# Patient Record
Sex: MALE | Race: WHITE | NOT HISPANIC OR LATINO | Employment: FULL TIME | URBAN - METROPOLITAN AREA
[De-identification: names, ages, dates, MRNs, and addresses within clinical notes are randomized per-mention and may not be internally consistent; named-entity substitution may affect disease eponyms.]

---

## 2018-09-12 ENCOUNTER — TELEPHONE (OUTPATIENT)
Dept: GASTROENTEROLOGY | Facility: CLINIC | Age: 48
End: 2018-09-12

## 2018-09-12 NOTE — TELEPHONE ENCOUNTER
Spoke with pt wife. I explained the process of an appointment with . Pt wife will work on getting referral and records faxed over.

## 2018-09-12 NOTE — TELEPHONE ENCOUNTER
----- Message from Selene Garcia sent at 9/12/2018  9:33 AM CDT -----  Contact: Pt wife Matilda Grey is requesting to schedule a NP visit for second opinion condition is Gastroparesis    Matilda is requesting a callback at [phone] says Dr Posada was highly recommended and would prefer to schedule with her     Thanks

## 2018-10-03 ENCOUNTER — TELEPHONE (OUTPATIENT)
Dept: GASTROENTEROLOGY | Facility: CLINIC | Age: 48
End: 2018-10-03

## 2018-10-03 NOTE — TELEPHONE ENCOUNTER
----- Message from Tracee Gann sent at 10/1/2018  8:49 AM CDT -----  Contact: wife - wes de la cruz  - [phone]  lammi - gastroparesis - is asking for appt - please call wife - wes de la cruz  - [phone]

## 2018-10-16 ENCOUNTER — OFFICE VISIT (OUTPATIENT)
Dept: GASTROENTEROLOGY | Facility: CLINIC | Age: 48
End: 2018-10-16
Payer: COMMERCIAL

## 2018-10-16 ENCOUNTER — TELEPHONE (OUTPATIENT)
Dept: ENDOSCOPY | Facility: HOSPITAL | Age: 48
End: 2018-10-16

## 2018-10-16 ENCOUNTER — TELEPHONE (OUTPATIENT)
Dept: GASTROENTEROLOGY | Facility: CLINIC | Age: 48
End: 2018-10-16

## 2018-10-16 ENCOUNTER — LAB VISIT (OUTPATIENT)
Dept: LAB | Facility: HOSPITAL | Age: 48
End: 2018-10-16
Payer: COMMERCIAL

## 2018-10-16 VITALS
DIASTOLIC BLOOD PRESSURE: 80 MMHG | WEIGHT: 248.69 LBS | SYSTOLIC BLOOD PRESSURE: 111 MMHG | HEIGHT: 67 IN | HEART RATE: 87 BPM | BODY MASS INDEX: 39.03 KG/M2

## 2018-10-16 DIAGNOSIS — K21.9 GASTROESOPHAGEAL REFLUX DISEASE WITHOUT ESOPHAGITIS: Primary | ICD-10-CM

## 2018-10-16 DIAGNOSIS — K21.9 GASTROESOPHAGEAL REFLUX DISEASE WITHOUT ESOPHAGITIS: ICD-10-CM

## 2018-10-16 DIAGNOSIS — R10.12 ABDOMINAL PAIN, BILATERAL UPPER QUADRANT: ICD-10-CM

## 2018-10-16 DIAGNOSIS — K76.0 FATTY LIVER: ICD-10-CM

## 2018-10-16 DIAGNOSIS — R10.11 ABDOMINAL PAIN, BILATERAL UPPER QUADRANT: ICD-10-CM

## 2018-10-16 DIAGNOSIS — R14.0 BLOATING: ICD-10-CM

## 2018-10-16 DIAGNOSIS — R11.2 NAUSEA AND VOMITING, INTRACTABILITY OF VOMITING NOT SPECIFIED, UNSPECIFIED VOMITING TYPE: ICD-10-CM

## 2018-10-16 DIAGNOSIS — R63.4 WEIGHT LOSS: ICD-10-CM

## 2018-10-16 DIAGNOSIS — R68.81 EARLY SATIETY: ICD-10-CM

## 2018-10-16 DIAGNOSIS — K92.1 BLACK STOOL: ICD-10-CM

## 2018-10-16 DIAGNOSIS — Z12.11 SCREEN FOR COLON CANCER: Primary | ICD-10-CM

## 2018-10-16 LAB
ALBUMIN SERPL BCP-MCNC: 4.1 G/DL
ALP SERPL-CCNC: 55 U/L
ALT SERPL W/O P-5'-P-CCNC: 41 U/L
ANION GAP SERPL CALC-SCNC: 7 MMOL/L
AST SERPL-CCNC: 24 U/L
BASOPHILS # BLD AUTO: 0.04 K/UL
BASOPHILS NFR BLD: 0.3 %
BILIRUB SERPL-MCNC: 1.1 MG/DL
BUN SERPL-MCNC: 7 MG/DL
CALCIUM SERPL-MCNC: 10 MG/DL
CHLORIDE SERPL-SCNC: 106 MMOL/L
CO2 SERPL-SCNC: 25 MMOL/L
CREAT SERPL-MCNC: 0.8 MG/DL
DIFFERENTIAL METHOD: ABNORMAL
EOSINOPHIL # BLD AUTO: 0.2 K/UL
EOSINOPHIL NFR BLD: 1.8 %
ERYTHROCYTE [DISTWIDTH] IN BLOOD BY AUTOMATED COUNT: 17.1 %
EST. GFR  (AFRICAN AMERICAN): >60 ML/MIN/1.73 M^2
EST. GFR  (NON AFRICAN AMERICAN): >60 ML/MIN/1.73 M^2
FERRITIN SERPL-MCNC: 106 NG/ML
GLUCOSE SERPL-MCNC: 126 MG/DL
HBV CORE AB SERPL QL IA: NEGATIVE
HBV SURFACE AG SERPL QL IA: NEGATIVE
HCT VFR BLD AUTO: 55.5 %
HCV AB SERPL QL IA: NEGATIVE
HEPATITIS A ANTIBODY, IGG: NEGATIVE
HGB BLD-MCNC: 18.4 G/DL
IGA SERPL-MCNC: 235 MG/DL
IMM GRANULOCYTES # BLD AUTO: 0.08 K/UL
IMM GRANULOCYTES NFR BLD AUTO: 0.6 %
IRON SERPL-MCNC: 65 UG/DL
LYMPHOCYTES # BLD AUTO: 3 K/UL
LYMPHOCYTES NFR BLD: 24.1 %
MCH RBC QN AUTO: 28.9 PG
MCHC RBC AUTO-ENTMCNC: 33.2 G/DL
MCV RBC AUTO: 87 FL
MONOCYTES # BLD AUTO: 0.7 K/UL
MONOCYTES NFR BLD: 5.3 %
NEUTROPHILS # BLD AUTO: 8.4 K/UL
NEUTROPHILS NFR BLD: 67.9 %
NRBC BLD-RTO: 0 /100 WBC
PLATELET # BLD AUTO: 273 K/UL
PMV BLD AUTO: 10.7 FL
POTASSIUM SERPL-SCNC: 4.1 MMOL/L
PROT SERPL-MCNC: 7.6 G/DL
RBC # BLD AUTO: 6.36 M/UL
SATURATED IRON: 19 %
SODIUM SERPL-SCNC: 138 MMOL/L
TOTAL IRON BINDING CAPACITY: 345 UG/DL
TRANSFERRIN SERPL-MCNC: 233 MG/DL
TSH SERPL DL<=0.005 MIU/L-ACNC: 2.26 UIU/ML
WBC # BLD AUTO: 12.37 K/UL

## 2018-10-16 PROCEDURE — 99999 PR PBB SHADOW E&M-EST. PATIENT-LVL IV: CPT | Mod: PBBFAC,,, | Performed by: NURSE PRACTITIONER

## 2018-10-16 PROCEDURE — 86706 HEP B SURFACE ANTIBODY: CPT

## 2018-10-16 PROCEDURE — 87340 HEPATITIS B SURFACE AG IA: CPT

## 2018-10-16 PROCEDURE — 82728 ASSAY OF FERRITIN: CPT

## 2018-10-16 PROCEDURE — 80053 COMPREHEN METABOLIC PANEL: CPT

## 2018-10-16 PROCEDURE — 86803 HEPATITIS C AB TEST: CPT

## 2018-10-16 PROCEDURE — 83540 ASSAY OF IRON: CPT

## 2018-10-16 PROCEDURE — 86790 VIRUS ANTIBODY NOS: CPT

## 2018-10-16 PROCEDURE — 85025 COMPLETE CBC W/AUTO DIFF WBC: CPT

## 2018-10-16 PROCEDURE — 84443 ASSAY THYROID STIM HORMONE: CPT

## 2018-10-16 PROCEDURE — 82784 ASSAY IGA/IGD/IGG/IGM EACH: CPT

## 2018-10-16 PROCEDURE — 86704 HEP B CORE ANTIBODY TOTAL: CPT

## 2018-10-16 PROCEDURE — 99245 OFF/OP CONSLTJ NEW/EST HI 55: CPT | Mod: S$GLB,,, | Performed by: NURSE PRACTITIONER

## 2018-10-16 PROCEDURE — 83516 IMMUNOASSAY NONANTIBODY: CPT

## 2018-10-16 RX ORDER — ONDANSETRON 4 MG/1
TABLET, ORALLY DISINTEGRATING ORAL
COMMUNITY
End: 2018-10-16

## 2018-10-16 RX ORDER — PROMETHAZINE HYDROCHLORIDE 25 MG/1
TABLET ORAL
COMMUNITY
End: 2018-11-20

## 2018-10-16 RX ORDER — TESTOSTERONE CYPIONATE 1000 MG/10ML
200 INJECTION, SOLUTION INTRAMUSCULAR
COMMUNITY

## 2018-10-16 RX ORDER — PROCHLORPERAZINE MALEATE 10 MG
10 TABLET ORAL 3 TIMES DAILY PRN
Qty: 90 TABLET | Refills: 6 | Status: SHIPPED | OUTPATIENT
Start: 2018-10-16 | End: 2018-11-15

## 2018-10-16 RX ORDER — POLYETHYLENE GLYCOL 3350, SODIUM SULFATE ANHYDROUS, SODIUM BICARBONATE, SODIUM CHLORIDE, POTASSIUM CHLORIDE 236; 22.74; 6.74; 5.86; 2.97 G/4L; G/4L; G/4L; G/4L; G/4L
4 POWDER, FOR SOLUTION ORAL ONCE
Qty: 4000 ML | Refills: 0 | Status: SHIPPED | OUTPATIENT
Start: 2018-10-16 | End: 2018-10-16

## 2018-10-16 RX ORDER — LOSARTAN POTASSIUM 100 MG/1
TABLET ORAL
COMMUNITY

## 2018-10-16 RX ORDER — IBUPROFEN 100 MG/5ML
SUSPENSION, ORAL (FINAL DOSE FORM) ORAL
COMMUNITY
End: 2018-12-17

## 2018-10-16 RX ORDER — ERGOCALCIFEROL 1.25 MG/1
CAPSULE ORAL
COMMUNITY

## 2018-10-16 RX ORDER — PANTOPRAZOLE SODIUM 40 MG/1
TABLET, DELAYED RELEASE ORAL
COMMUNITY
End: 2018-12-10

## 2018-10-16 NOTE — TELEPHONE ENCOUNTER
Patient is scheduled for his EGD/Colonoscopy on November 19th at 1:00pm.    Patient can now be scheduled for his GES. Patient wants the next day Nov 20th please.    See note below:    Aysha Smith NP          10/16/18 12:34 PM   Note         GI procedures:  Day 1: EGD/colon. 5 days full liquids  Day 2: GES

## 2018-10-16 NOTE — PROGRESS NOTES
[facility] Consultation Note    Reason for Consult:    Chief Complaint   Patient presents with    Abdominal Pain     central , but after eating UQ    Heartburn    Nausea    Emesis    Gas    Bloated    Weight Loss         PCP:   Jamia Mckeon   [address] [facility] / [place] [address]    Referring MD:  Hector Porter Md  [address] [place] [address]     Current GI: /Janna Rodriguez NP      HPI:  Isaak Webb is a 48 y.o. male with a PMH of DM 2, HTN, s/p russell  referred to motility clinic for second opinion regarding the following problems:    GERD.  Patient reports bothersome heartburn  Retrosternal pyrosis:yes  Regurgitation:sometimes  Belching:yes  Onset: several years  Frequency: couple days weekly  Improve with:  Some improvement with pantoprazole 40 mg once daily. Minimal improvement with nexium and prilosec. Cannot recall if dexilant helped. Has not tied prevacid or aciphex  Upright symptoms:no   Nocturnal symptoms:  yes  Hoarseness:no  Cough:no  Throat clearing:yes  Food Triggers:none  Caffeine intake:6-8 coke zero/day  Was sleeping with head of the bed elevated.   Avoids eating prior to bedtime.      EOE. Diagnosed 10 years ago. H/o swallowed fluticasone several years ago. Now on pantoprazole daily.     H/o dysphagia. None since starting pantoprazole 40 mg daily x 4-6 months.    Nausea.    Frequency:daily  Onset: summer 2017    Early satiety.  Frequency:daily  Onset:summer 2017    Vomiting  Frequency:couple days monthly  Onset:summer 2017  Timing of vomiting:  Within 30 min of eating:no  Within 3 hours after eating: yes       Cyclical episodes of n/v with symptom free intervals between episodes:yes. Would go couple months b/t episodes of severe n/v for few days at s/s onset. More constant nausea recently.   Prodrome:belching  History of migraines:no PH, mother with migraines  Marijuana use:no  Unusual bathing behaviors:no    Improves with:  No   improvement with zofran 4 mg q 4-6 hrs   Some improvement with phenergan 25 mg suppositories PRN, but sedates him  Has not tried compazine, reglan, domperidone, scopolamine patch      Gastroparesis diagnosed: June 2018 based on s/s    Etiology:    Dm-yes   Idopathic  Postsurgical-no  Parkinsonism-no  Amyloidosis-no  Paraneoplastic disease-no  Scleroderma-no  Mesenteric ischemia-no    History of:  Prior gastric or bariatric surgery: no  Diabetes mellitus: no  Thyroid dysfunction: no  Neurological disease: no   Autoimmune disorders: no    Number of GP related hospitalization in the past year: none  Number of GP related ED visit in the past year: none    Interventions: (pyloroplasty, botox, gastric stimulator, gastroparesis diet): on GP diet. Soups, soft foods, smoothies TID, 2 sm snacks daily. No dietitian.      Curenlty on following medications that may affect gastric emptying:   Narcotics (morphine, oxycodone, tapentadol, less with tramadol):no  Anticholinergics: no  Cyclosporine:no  Diabetic medications (GLP-1 analogs, Exenatide, Lixisenatide, Livaglutide, Albiglutide, DulaglutatidePramlintide - SymlinPen (injection):none    DM 2. Onset: couple years ago. Has not been checking BS. HbA1c 6.5 last month. Taking glyxambi 25-5 mg daily x 1 year. Per pcp. H/o metformin, injections in the past.    Abdominal pain. Reports abdominal pain  Character:tender epigastric, postprandial cramping  Location and frequency:epigastric constant, postprandial across upper abd  Duration:cramping lasts for 1-2 hours  Onset:summer 2017  Worse with:meals  Improves with:lying down  Associated with Bm: no  Nocturnal pain: yes  Has not tried IBgard, Bentyl, Levsin, Levbid.  Antidepressants:no  Using narcotic pain medication: no   NSAIDs:ibuprofen couple days weekly for HAs    Gas and bloating.   Bloating: yes  Excessive gas: yes  Abdominal distension:no   Symptoms get worse after meals:yes  Symptoms get worse as the day progresses:   yes  Consumes lactose:yes  Consumes artificial sugars:yes    Black stools. Once week. Onset: few months ago. No pepto, charco caps, iron.    Weight loss. Lost 30 lbs since 7/2018. Has lost 5 lbs in the last few weeks.     Fatty liver. Hepatomegaly on abdominal us.       Denies dysphagia,  diarrhea, constipation, BRBPR, anxiety/depression, insomnia.       Total visit time was 90 minutes, more than 50% of which was spent in face-to-face counseling with patient regarding symptoms, diagnostic results, prognosis, risks and benefits of treatment options, instructions for management, importance of compliance with chosen treatment options, risk factor reduction, stress reduction, coping strategies.      Previous Studies:   EGD 7/2/18: nl esophagus (reflux esophagitis). Stomach nl (chr gastritis). Nl duodenum (mild peptic changes).  Ct abd/pelvis 4/4/18: nl s/p russell  Abd us 3/22/18: limited by body habitus. s/p russell. Hepatomegaly w/ fatty liver  EGD 5/31/11: mid esophageal rings (inflammation w/ numerous eosinophils c/w EOE similar to 2007). Nl stomach. (mild chr gastritis) nl duodenum.     Labs:   5/11/2018: cmp nl, wbc high 10.6, RDW high 16.9    Relevant surgeries  Russell (20 yrs ago)    ROS:  ROS   Constitutional: No fevers, no chills, + night sweats, + weight loss  ENT: No congestion, no rhinorrhea, no chronic sinus problems  CV: No chest pain, no palpitations  Pulm: No cough, no shortness of breath  Ophtho: No blurry vision, no eye redness  GI: see HPI  Derm: No rash  Heme: No lymphadenopathy, no bruising  MSK: No joint pain, no joint swelling, no Raynauds  : No dysuria, no frequent urination, no blood in urine  Endo: No hot or cold intolerance  Neuro: No dizziness, no syncope, no seizure  Psych: No anxiety, no depression        Medical History:   Past Medical History:   Diagnosis Date    Cholelithiasis     GERD (gastroesophageal reflux disease)         Surgical History:   Past Surgical History:   Procedure  Laterality Date    CHOLECYSTECTOMY      esophagus dilation       UPPER GASTROINTESTINAL ENDOSCOPY          Family History:   Family History   Problem Relation Age of Onset    Melanoma Father     Skin cancer Maternal Uncle     Celiac disease Neg Hx     Cirrhosis Neg Hx     Colon cancer Neg Hx     Colon polyps Neg Hx     Crohn's disease Neg Hx     Cystic fibrosis Neg Hx     Esophageal cancer Neg Hx     Hemochromatosis Neg Hx     Irritable bowel syndrome Neg Hx     Inflammatory bowel disease Neg Hx     Liver cancer Neg Hx     Liver disease Neg Hx     Rectal cancer Neg Hx     Stomach cancer Neg Hx     Ulcerative colitis Neg Hx     Jerry's disease Neg Hx     Lymphoma Neg Hx     Tuberculosis Neg Hx     Scleroderma Neg Hx     Rheum arthritis Neg Hx     Multiple sclerosis Neg Hx     Lupus Neg Hx     Psoriasis Neg Hx         Social History:   Social History     Socioeconomic History    Marital status:      Spouse name: None    Number of children: None    Years of education: None    Highest education level: None   Social Needs    Financial resource strain: None    Food insecurity - worry: None    Food insecurity - inability: None    Transportation needs - medical: None    Transportation needs - non-medical: None   Occupational History    None   Tobacco Use    Smoking status: Never Smoker    Smokeless tobacco: Never Used   Substance and Sexual Activity    Alcohol use: No     Frequency: Never    Drug use: No    Sexual activity: None   Other Topics Concern    None   Social History Narrative    None        Review of patient's allergies indicates:  No Known Allergies    Current Outpatient Medications   Medication Sig Dispense Refill    ascorbic acid, vitamin C, (VITAMIN C) 1000 MG tablet Vitamin C      cetirizine (ZYRTEC) 10 mg Cap Zyrtec 10 mg capsule   Take by oral route.      empagliflozin-linagliptin (GLYXAMBI) 25-5 mg Tab Glyxambi 25 mg-5 mg tablet   Take 1 tablet every  "day by oral route.      ergocalciferol (ERGOCALCIFEROL) 50,000 unit Cap Vitamin D2 50,000 unit capsule   Take 1 capsule every week by oral route.      flu vaccine ts 2016-17,4yr up, (FLUVIRIN [id number]) 45 mcg (15 mcg x 3)/0.5 mL Susp Fluvirin 3301-7205 45 mcg (15 mcg x 3)/0.5 mL intramuscular suspension   ADM 0.5ML IM UTD      FLUZONE QUAD [id number], PF, 60 mcg (15 mcg x 4)/0.5 mL Syrg ADM 0.5ML IM UTD  0    losartan (COZAAR) 100 MG tablet losartan 100 mg tablet   Take 1 tablet every day by oral route.      multivit with minerals/lutein (MULTIVITAMIN 50 PLUS ORAL) multivitamin      omega 3-dha-epa-fish oil (FISH OIL) 100-160-1,000 mg Cap Take 1,000 mg by mouth once daily.      pantoprazole (PROTONIX) 40 MG tablet pantoprazole 40 mg tablet,delayed release   Take 1 tablet every day by oral route.      promethazine (PHENERGAN) 25 MG tablet promethazine 25 mg tablet   Take 1 tablet every 6 hours by oral route.      testosterone cypionate (DEPOTESTOTERONE CYPIONATE) 100 mg/mL injection Inject 200 mg/mL as directed.      polyethylene glycol (GOLYTELY,NULYTELY) 236-22.74-6.74 -5.86 gram suspension Take 4,000 mLs (4 L total) by mouth once. for 1 dose 4000 mL 0    prochlorperazine (COMPAZINE) 10 MG tablet Take 1 tablet (10 mg total) by mouth 3 (three) times daily as needed. 90 tablet 6     No current facility-administered medications for this visit.         Objective Findings:  Vital Signs:  /80   Pulse 87   Ht 5' 7" (1.702 m)   Wt 112.8 kg (248 lb 10.9 oz)   BMI 38.95 kg/m²   Body mass index is 38.95 kg/m².    Physical Exam:  General appearance: alert, cooperative, no distress  HENT: Normocephalic, atraumatic, neck symmetrical, no nasal discharge  Eyes: conjunctivae/corneas clear, PERRL, EOM's intact  Lungs: clear to auscultation bilaterally, no dullness to percussion bilaterally  Heart: regular rate and rhythm without rub; no displacement of the PMI  Abdomen: soft, non-tender; bowel sounds " normoactive; no organomegaly  Extremities: extremities symmetric; no clubbing, cyanosis, or edema  Integument: Skin color, texture, turgor normal; no rashes; hair distrubution normal  Neurologic: Alert and oriented X 3, normal strength, normal coordination and gait  Psychiatric: no pressured speech; normal affect; no evidence of impaired cognition    Labs:  Lab Results   Component Value Date    WBC 12.37 10/16/2018    HGB 18.4 (H) 10/16/2018    HCT 55.5 (H) 10/16/2018    MCV 87 10/16/2018     10/16/2018     No results found for: FERRITIN  No results found for: NA, K, CL, CO2, GLU, BUN, CREATININE, CALCIUM, PROT, ALBUMIN, BILITOT, ALKPHOS, AST, ALT  No results found for: TSH  No results found for: SEDRATE  No results found for: CRP  No results found for: LABA1C, HGBA1C        Assessment and Plan:  Isaak Webb is a 48 y.o. male with a PMH of DM 2, HTN, s/p russell  referred to motility clinic for second opinion regarding the following problems:    GERD.      Minimal improvement with nexium and prilosec.   Cannot recall if dexilant helped.  Some improvement with pantoprazole 40 mg once daily.  - Has not tied prevacid or aciphex    Eosinophilic esopahgitis. Diagnosed 10 years ago. Has taken swallowed fluticasone several years ago. Now on pantoprazole daily.     History of dysphagia. None since starting pantoprazole 40 mg daily x 4-6 months.    Nausea and early satiety daily. Vomiting few times per month. Symptoms appeared to have been cyclical at symptom onset, but have been more constant since. Was told he has gastroparesis in 6/2018. Never had gastric emptying study.   No  improvement with zofran 4 mg q 4-6 hrs   Some improvement with phenergan 25 mg suppositories PRN, but causes sedation  -Start compazine 10 mg Q8 hrs PRN  -EGD w/ e/g/d biopsies  -Gastric emptying study   -Check labs  -Discussed pathophysiology, prognosis, workup and treatment of gastroparesis, including surgical options.  Provided  handout.   -Discussed gastroparesis diet, provided handout.    -referral to GI dietitian.  -Has not tried reglan, domperidone, scopolamine patch      DM 2. X couple of years. HbA1c 6.5 last month. Has taken metformin and injections in the past.  Taking glyxambi 25-5 mg daily x 1 year. Per pcp.     Abdominal pain. Nocturnal pain.  On Ibuprofen couple days weekly   -EGD  -Labs  -GES  -Start FDgard PRN  -Has not tried IBgard, Bentyl, Levsin, Levbid.    Gas and bloating.   -Eliminate artificial sugars and lactose    Black stools. Once week. Onset: few months ago. No pepto, charco caps, iron.  -EGD  -Labs    Weight loss. Lost 30 lbs since 7/2018. Has lost 5 lbs in the last few weeks.   -EGD/colon    Fatty liver. Hepatomegaly on abdominal us.   -Check labs      Follow-up in about 2 months (around 12/16/2018) for Motility w/ Thierry,NP.    1. Gastroesophageal reflux disease without esophagitis    2. Abdominal pain, bilateral upper quadrant    3. Bloating    4. Nausea and vomiting, intractability of vomiting not specified, unspecified vomiting type    5. Black stool    6. Weight loss    7. Fatty liver    8. Early satiety          Order summary:  Orders Placed This Encounter    NM Gastric Emptying    CBC auto differential    Comprehensive metabolic panel    TSH    TISSUE TRANSGLUTAMINASE (TTG), IGA    IgA    Iron and TIBC    Ferritin    Hepatitis B Surface Antibody, Qual/Quant    Hepatitis B surface antigen    Hepatitis B core antibody, total    Hepatitis C antibody    Hepatitis A antibody, IgG    prochlorperazine (COMPAZINE) 10 MG tablet    Case request GI: EGD (ESOPHAGOGASTRODUODENOSCOPY), COLONOSCOPY         Thank you so much for allowing me to participate in the care of Isaak Webb          Aysha Smith, APRN, FNP-C    I have seen and examined the patient with the resident.  I agree with his note, assessment and plan.      Idalia Posada MD

## 2018-10-16 NOTE — LETTER
October 19, 2018        Hector Porter MD  [address] [place] [id number]             [facility]  [address]  [place] [address]  Phone: [phone]  Fax: [phone]   Patient: Isaak Webb   MR Number: [id number]   YOB: 1970   Date of Visit: 10/16/2018       Dear Dr. Porter:    Thank you for referring Isaak Webb to me for evaluation. Attached you will find relevant portions of my assessment and plan of care.    If you have questions, please do not hesitate to call me. I look forward to following Isaak Webb along with you.    Sincerely,                  CC  No Recipients    Enclosure

## 2018-10-16 NOTE — PATIENT INSTRUCTIONS
Eliminate all forms of dairy/lactose (milk, cheese, butter, creamers, ice cream etc) and artificial sweeteners (splenda, equal, stevia, truvia, crystal lite, diet sodas, sugar free candy/gum etc) from your diet for 14 days to see if gas and bloating improve.    Try over the counter FDgard as needed for abdominal pain.

## 2018-10-18 LAB
HEP. B SURF AB, QUAL: NEGATIVE
HEP. B SURF AB, QUANT.: <3 MIU/ML
TTG IGA SER IA-ACNC: 5 UNITS

## 2018-11-01 ENCOUNTER — TELEPHONE (OUTPATIENT)
Dept: GASTROENTEROLOGY | Facility: CLINIC | Age: 48
End: 2018-11-01

## 2018-11-01 DIAGNOSIS — K76.0 FATTY LIVER: Primary | ICD-10-CM

## 2018-11-01 DIAGNOSIS — R16.0 HEPATOMEGALY: ICD-10-CM

## 2018-11-01 DIAGNOSIS — R17 SERUM TOTAL BILIRUBIN ELEVATED: ICD-10-CM

## 2018-11-05 ENCOUNTER — TELEPHONE (OUTPATIENT)
Dept: GASTROENTEROLOGY | Facility: CLINIC | Age: 48
End: 2018-11-05

## 2018-11-19 ENCOUNTER — HOSPITAL ENCOUNTER (OUTPATIENT)
Facility: HOSPITAL | Age: 48
Discharge: HOME | End: 2018-11-19
Attending: INTERNAL MEDICINE | Admitting: INTERNAL MEDICINE
Payer: COMMERCIAL

## 2018-11-19 ENCOUNTER — ANESTHESIA EVENT (OUTPATIENT)
Dept: ENDOSCOPY | Facility: HOSPITAL | Age: 48
End: 2018-11-19
Payer: COMMERCIAL

## 2018-11-19 ENCOUNTER — ANESTHESIA (OUTPATIENT)
Dept: ENDOSCOPY | Facility: HOSPITAL | Age: 48
End: 2018-11-19
Payer: COMMERCIAL

## 2018-11-19 VITALS
OXYGEN SATURATION: 97 % | HEIGHT: 67 IN | BODY MASS INDEX: 38.45 KG/M2 | DIASTOLIC BLOOD PRESSURE: 78 MMHG | SYSTOLIC BLOOD PRESSURE: 122 MMHG | RESPIRATION RATE: 16 BRPM | HEART RATE: 93 BPM | TEMPERATURE: 98 F | WEIGHT: 245 LBS

## 2018-11-19 DIAGNOSIS — R63.4 WEIGHT LOSS: Primary | ICD-10-CM

## 2018-11-19 LAB
GLUCOSE SERPL-MCNC: 95 MG/DL (ref 70–110)
POCT GLUCOSE: 95 MG/DL (ref 70–110)

## 2018-11-19 PROCEDURE — 88342 IMHCHEM/IMCYTCHM 1ST ANTB: CPT | Mod: 26,,, | Performed by: PATHOLOGY

## 2018-11-19 PROCEDURE — 45385 COLONOSCOPY W/LESION REMOVAL: CPT | Performed by: INTERNAL MEDICINE

## 2018-11-19 PROCEDURE — 25000003 PHARM REV CODE 250: Performed by: NURSE ANESTHETIST, CERTIFIED REGISTERED

## 2018-11-19 PROCEDURE — E9220 PRA ENDO ANESTHESIA: HCPCS | Mod: ,,, | Performed by: NURSE ANESTHETIST, CERTIFIED REGISTERED

## 2018-11-19 PROCEDURE — 25000003 PHARM REV CODE 250: Performed by: INTERNAL MEDICINE

## 2018-11-19 PROCEDURE — 45381 COLONOSCOPY SUBMUCOUS NJX: CPT | Mod: 51,,, | Performed by: INTERNAL MEDICINE

## 2018-11-19 PROCEDURE — 37000008 HC ANESTHESIA 1ST 15 MINUTES: Performed by: INTERNAL MEDICINE

## 2018-11-19 PROCEDURE — 27201012 HC FORCEPS, HOT/COLD, DISP: Performed by: INTERNAL MEDICINE

## 2018-11-19 PROCEDURE — 37000009 HC ANESTHESIA EA ADD 15 MINS: Performed by: INTERNAL MEDICINE

## 2018-11-19 PROCEDURE — 45380 COLONOSCOPY AND BIOPSY: CPT | Performed by: INTERNAL MEDICINE

## 2018-11-19 PROCEDURE — 43239 EGD BIOPSY SINGLE/MULTIPLE: CPT | Performed by: INTERNAL MEDICINE

## 2018-11-19 PROCEDURE — 88305 TISSUE EXAM BY PATHOLOGIST: CPT | Mod: 26,,, | Performed by: PATHOLOGY

## 2018-11-19 PROCEDURE — 45380 COLONOSCOPY AND BIOPSY: CPT | Mod: 59,,, | Performed by: INTERNAL MEDICINE

## 2018-11-19 PROCEDURE — 43239 EGD BIOPSY SINGLE/MULTIPLE: CPT | Mod: 51,,, | Performed by: INTERNAL MEDICINE

## 2018-11-19 PROCEDURE — 88342 IMHCHEM/IMCYTCHM 1ST ANTB: CPT | Performed by: PATHOLOGY

## 2018-11-19 PROCEDURE — 63600175 PHARM REV CODE 636 W HCPCS: Performed by: NURSE ANESTHETIST, CERTIFIED REGISTERED

## 2018-11-19 PROCEDURE — 27201089 HC SNARE, DISP (ANY): Performed by: INTERNAL MEDICINE

## 2018-11-19 PROCEDURE — 45381 COLONOSCOPY SUBMUCOUS NJX: CPT | Performed by: INTERNAL MEDICINE

## 2018-11-19 PROCEDURE — 82962 GLUCOSE BLOOD TEST: CPT | Performed by: INTERNAL MEDICINE

## 2018-11-19 PROCEDURE — 45385 COLONOSCOPY W/LESION REMOVAL: CPT | Mod: ,,, | Performed by: INTERNAL MEDICINE

## 2018-11-19 PROCEDURE — 88305 TISSUE EXAM BY PATHOLOGIST: CPT | Performed by: PATHOLOGY

## 2018-11-19 RX ORDER — SODIUM CHLORIDE 9 MG/ML
INJECTION, SOLUTION INTRAVENOUS CONTINUOUS PRN
Status: DISCONTINUED | OUTPATIENT
Start: 2018-11-19 | End: 2018-11-19

## 2018-11-19 RX ORDER — PROPOFOL 10 MG/ML
VIAL (ML) INTRAVENOUS CONTINUOUS PRN
Status: DISCONTINUED | OUTPATIENT
Start: 2018-11-19 | End: 2018-11-19

## 2018-11-19 RX ORDER — LIDOCAINE HCL/PF 100 MG/5ML
SYRINGE (ML) INTRAVENOUS
Status: DISCONTINUED | OUTPATIENT
Start: 2018-11-19 | End: 2018-11-19

## 2018-11-19 RX ORDER — ETOMIDATE 2 MG/ML
INJECTION INTRAVENOUS
Status: DISCONTINUED | OUTPATIENT
Start: 2018-11-19 | End: 2018-11-19

## 2018-11-19 RX ORDER — PROPOFOL 10 MG/ML
VIAL (ML) INTRAVENOUS
Status: DISCONTINUED | OUTPATIENT
Start: 2018-11-19 | End: 2018-11-19

## 2018-11-19 RX ORDER — SODIUM CHLORIDE 9 MG/ML
INJECTION, SOLUTION INTRAVENOUS CONTINUOUS
Status: DISCONTINUED | OUTPATIENT
Start: 2018-11-19 | End: 2018-11-19 | Stop reason: HOSPADM

## 2018-11-19 RX ORDER — SODIUM CHLORIDE 0.9 % (FLUSH) 0.9 %
3 SYRINGE (ML) INJECTION
Status: DISCONTINUED | OUTPATIENT
Start: 2018-11-19 | End: 2018-11-19 | Stop reason: HOSPADM

## 2018-11-19 RX ORDER — GLYCOPYRROLATE 0.2 MG/ML
INJECTION INTRAMUSCULAR; INTRAVENOUS
Status: DISCONTINUED | OUTPATIENT
Start: 2018-11-19 | End: 2018-11-19

## 2018-11-19 RX ADMIN — SODIUM CHLORIDE: 0.9 INJECTION, SOLUTION INTRAVENOUS at 12:11

## 2018-11-19 RX ADMIN — PROPOFOL 20 MG: 10 INJECTION, EMULSION INTRAVENOUS at 01:11

## 2018-11-19 RX ADMIN — GLYCOPYRROLATE 0.2 MG: 0.2 INJECTION, SOLUTION INTRAMUSCULAR; INTRAVENOUS at 01:11

## 2018-11-19 RX ADMIN — ETOMIDATE 4 MG: 2 INJECTION, SOLUTION INTRAVENOUS at 02:11

## 2018-11-19 RX ADMIN — SODIUM CHLORIDE: 9 INJECTION, SOLUTION INTRAVENOUS at 01:11

## 2018-11-19 RX ADMIN — PROPOFOL 30 MG: 10 INJECTION, EMULSION INTRAVENOUS at 01:11

## 2018-11-19 RX ADMIN — LIDOCAINE HYDROCHLORIDE 100 MG: 20 INJECTION, SOLUTION INTRAVENOUS at 01:11

## 2018-11-19 RX ADMIN — PROPOFOL 150 MCG/KG/MIN: 10 INJECTION, EMULSION INTRAVENOUS at 01:11

## 2018-11-19 RX ADMIN — PROPOFOL 40 MG: 10 INJECTION, EMULSION INTRAVENOUS at 01:11

## 2018-11-19 RX ADMIN — PROPOFOL 70 MG: 10 INJECTION, EMULSION INTRAVENOUS at 01:11

## 2018-11-19 NOTE — ANESTHESIA POSTPROCEDURE EVALUATION
"Anesthesia Post Evaluation    Patient: Isaak Webb    Procedure(s) Performed: Procedure(s) (LRB):  EGD (ESOPHAGOGASTRODUODENOSCOPY) (N/A)  COLONOSCOPY (N/A)    Final Anesthesia Type: general  Patient location during evaluation: GI PACU  Patient participation: Yes- Able to Participate  Level of consciousness: awake and alert and oriented  Post-procedure vital signs: reviewed and stable  Pain management: adequate  Airway patency: patent  PONV status at discharge: No PONV  Anesthetic complications: no      Cardiovascular status: blood pressure returned to baseline  Respiratory status: unassisted, spontaneous ventilation and room air  Hydration status: euvolemic  Follow-up not needed.        Visit Vitals  /69 (BP Location: Left arm, Patient Position: Lying)   Pulse 90   Temp 36.7 °C (98.1 °F)   Resp 20   Ht 5' 7" (1.702 m)   Wt 111.1 kg (245 lb)   SpO2 97%   BMI 38.37 kg/m²       Pain/Janusz Score: Pain Assessment Performed: Yes (11/19/2018  2:42 PM)  Presence of Pain: denies (11/19/2018  2:42 PM)  Janusz Score: 9 (11/19/2018  2:42 PM)        "

## 2018-11-19 NOTE — DISCHARGE INSTRUCTIONS

## 2018-11-19 NOTE — H&P
Short Stay Endoscopy History and Physical    PCP - Jamia Mckeon MD     Procedure - EGD/colon  ASA - per anesthesia  Mallampati - per anesthesia  History of Anesthesia problems - no  Family history Anesthesia problems -  no   Plan of anesthesia - General    HPI:  This is a 48 y.o. male here for evaluation of dysphagia, EoE, GERD, nausea, satiety, abd pain, melena, weight loss.      ROS:  Constitutional: No fevers, chills, No weight loss  CV: No chest pain  Pulm: No cough, No shortness of breath  Ophtho: No vision changes  GI: see HPI  Derm: No rash    Medical History:  has a past medical history of Cholelithiasis and GERD (gastroesophageal reflux disease).    Surgical History:  has a past surgical history that includes Cholecystectomy; Upper gastrointestinal endoscopy; and esophagus dilation .    Family History: family history includes Melanoma in his father; Skin cancer in his maternal uncle.. Otherwise no colon cancer, inflammatory bowel disease, or GI malignancies.    Social History:  reports that  has never smoked. he has never used smokeless tobacco. He reports that he does not drink alcohol or use drugs.    Review of patient's allergies indicates:  No Known Allergies    Medications:   Medications Prior to Admission   Medication Sig Dispense Refill Last Dose    ascorbic acid, vitamin C, (VITAMIN C) 1000 MG tablet Vitamin C   11/18/2018 at Unknown time    cetirizine (ZYRTEC) 10 mg Cap Zyrtec 10 mg capsule   Take by oral route.   11/18/2018 at Unknown time    empagliflozin-linagliptin (GLYXAMBI) 25-5 mg Tab Glyxambi 25 mg-5 mg tablet   Take 1 tablet every day by oral route.   11/18/2018 at Unknown time    ergocalciferol (ERGOCALCIFEROL) 50,000 unit Cap Vitamin D2 50,000 unit capsule   Take 1 capsule every week by oral route.   11/18/2018 at Unknown time    losartan (COZAAR) 100 MG tablet losartan 100 mg tablet   Take 1 tablet every day by oral route.   11/19/2018 at Unknown time    multivit with  minerals/lutein (MULTIVITAMIN 50 PLUS ORAL) multivitamin   11/18/2018 at Unknown time    omega 3-dha-epa-fish oil (FISH OIL) 100-160-1,000 mg Cap Take 1,000 mg by mouth once daily.   11/18/2018 at Unknown time    pantoprazole (PROTONIX) 40 MG tablet pantoprazole 40 mg tablet,delayed release   Take 1 tablet every day by oral route.   11/18/2018 at Unknown time    testosterone cypionate (DEPOTESTOTERONE CYPIONATE) 100 mg/mL injection Inject 200 mg/mL as directed.   Past Month at Unknown time    flu vaccine ts 2016-17,4yr up, (FLUVIRIN 2358-2829) 45 mcg (15 mcg x 3)/0.5 mL Susp Fluvirin 7759-5448 45 mcg (15 mcg x 3)/0.5 mL intramuscular suspension   ADM 0.5ML IM UTD   More than a month at Unknown time    FLUZONE QUAD [id number], PF, 60 mcg (15 mcg x 4)/0.5 mL Syrg ADM 0.5ML IM UTD  0 More than a month at Unknown time    promethazine (PHENERGAN) 25 MG tablet promethazine 25 mg tablet   Take 1 tablet every 6 hours by oral route.   More than a month at Unknown time       Physical Exam:    Vital Signs:   Vitals:    11/19/18 1305   BP: 115/66   Pulse: 83   Resp: 17   Temp: 98.8 °F (37.1 °C)       General Appearance: Well appearing in no acute distress  Eyes:    No scleral icterus  Lungs: CTA anteriorly  Heart:  Regular rate, S1, S2 normal, no murmurs heard.  Abdomen: Soft, non tender, non distended with normal bowel sounds. No hepatosplenomegaly, ascites, or mass.  Extremities: No edema  Skin: No rash    Labs:  Lab Results   Component Value Date    WBC 12.37 10/16/2018    HGB 18.4 (H) 10/16/2018    HCT 55.5 (H) 10/16/2018     10/16/2018    ALT 41 10/16/2018    AST 24 10/16/2018     10/16/2018    K 4.1 10/16/2018     10/16/2018    CREATININE 0.8 10/16/2018    BUN 7 10/16/2018    CO2 25 10/16/2018    TSH 2.256 10/16/2018       I have explained the risks and benefits of endoscopy procedures to the patient including but not limited to bleeding, perforation, infection, and death.      Idalia Posada,  MD

## 2018-11-19 NOTE — ANESTHESIA PREPROCEDURE EVALUATION
11/19/2018  Isaak Webb is a 48 y.o., male.  Past Medical History:   Diagnosis Date    Cholelithiasis     GERD (gastroesophageal reflux disease)      Past Surgical History:   Procedure Laterality Date    CHOLECYSTECTOMY      esophagus dilation       UPPER GASTROINTESTINAL ENDOSCOPY           Anesthesia Evaluation    I have reviewed the Patient Summary Reports.    I have reviewed the Nursing Notes.   I have reviewed the Medications.     Review of Systems  Anesthesia Hx:  No problems with previous Anesthesia  Neg history of prior surgery. Denies Family Hx of Anesthesia complications.   Denies Personal Hx of Anesthesia complications.   Hematology/Oncology:  Hematology Normal   Oncology Normal     EENT/Dental:EENT/Dental Normal   Cardiovascular:   Hypertension    Pulmonary:  Pulmonary Normal    Renal/:  Renal/ Normal     Hepatic/GI:   GERD    Musculoskeletal:  Musculoskeletal Normal    Neurological:  Neurology Normal    Endocrine:   Diabetes    Dermatological:  Skin Normal    Psych:  Psychiatric Normal           Physical Exam  General:  Well nourished    Airway/Jaw/Neck:  Airway Findings: Mouth Opening: Normal General Airway Assessment: Adult  Mallampati: III  Improves to II with phonation.  TM Distance: Normal, at least 6 cm        Eyes/Ears/Nose:  EYES/EARS/NOSE FINDINGS: Normal   Dental:  DENTAL FINDINGS: Normal   Chest/Lungs:  Chest/Lungs Findings: Clear to auscultation, Normal Respiratory Rate     Heart/Vascular:  Heart Findings: Rate: Normal  Rhythm: Regular Rhythm  Heart murmur: negative Vascular Findings: Normal    Abdomen:  Abdomen Findings: Normal    Musculoskeletal:  Musculoskeletal Findings: Normal   Skin:  Skin Findings: Normal    Mental Status:  Mental Status Findings: Normal        Anesthesia Plan  Type of Anesthesia, risks & benefits discussed:  Anesthesia Type:  general  Patient's  Preference:   Intra-op Monitoring Plan: standard ASA monitors  Intra-op Monitoring Plan Comments:   Post Op Pain Control Plan:   Post Op Pain Control Plan Comments:   Induction:   IV  Beta Blocker:  Patient is not currently on a Beta-Blocker (No further documentation required).       Informed Consent: Patient understands risks and agrees with Anesthesia plan.  Questions answered. Anesthesia consent signed with patient.  ASA Score: 3     Day of Surgery Review of History & Physical:    H&P update referred to the provider.         Ready For Surgery From Anesthesia Perspective.

## 2018-11-19 NOTE — PROVATION PATIENT INSTRUCTIONS
Discharge Summary/Instructions after an Endoscopic Procedure  Patient Name: Isaak Webb  Patient MRN: [id number]  Patient YOB: 1970  Monday, November 19, 2018  Idalia Posada MD  RESTRICTIONS:  During your procedure today, you received medications for sedation.  These   medications may affect your judgment, balance and coordination.  Therefore,   for 24 hours, you have the following restrictions:   - DO NOT drive a car, operate machinery, make legal/financial decisions,   sign important papers or drink alcohol.    ACTIVITY:  Today: no heavy lifting, straining or running due to procedural   sedation/anesthesia.  The following day: return to full activity including work.  DIET:  Eat and drink normally unless instructed otherwise.     TREATMENT FOR COMMON SIDE EFFECTS:  - Mild abdominal pain, nausea, belching, bloating or excessive gas:  rest,   eat lightly and use a heating pad.  - Sore Throat: treat with throat lozenges and/or gargle with warm salt   water.  - Because air was used during the procedure, expelling large amounts of air   from your rectum or belching is normal.  - If a bowel prep was taken, you may not have a bowel movement for 1-3 days.    This is normal.  SYMPTOMS TO WATCH FOR AND REPORT TO YOUR PHYSICIAN:  1. Abdominal pain or bloating, other than gas cramps.  2. Chest pain.  3. Back pain.  4. Signs of infection such as: chills or fever occurring within 24 hours   after the procedure.  5. Rectal bleeding, which would show as bright red, maroon, or black stools.   (A tablespoon of blood from the rectum is not serious, especially if   hemorrhoids are present.)  6. Vomiting.  7. Weakness or dizziness.  GO DIRECTLY TO THE NEAREST EMERGENCY ROOM IF YOU HAVE ANY OF THE FOLLOWING:      Difficulty breathing              Chills and/or fever over 101 F   Persistent vomiting and/or vomiting blood   Severe abdominal pain   Severe chest pain   Black, tarry stools   Bleeding- more than one  tablespoon   Any other symptom or condition that you feel may need urgent attention  Your doctor recommends these additional instructions:  If any biopsies were taken, your doctors clinic will contact you in 1 to 2   weeks with any results.  - Await pathology results.   - Discharge patient to home (with escort).   - Resume previous diet.   - Continue present medications.   - Perform a colonoscopy today.   - The findings and recommendations were discussed with the patient.   - Patient has a contact number available for emergencies.  The signs and   symptoms of potential delayed complications were discussed with the   patient.  Return to normal activities tomorrow.  Written discharge   instructions were provided to the patient.  For questions, problems or results please call your physician - Idalia Posada MD at Work:  [phone].  [facility], EMERGENCY ROOM PHONE NUMBER: [phone]  IF A COMPLICATION OR EMERGENCY SITUATION ARISES AND YOU ARE UNABLE TO REACH   YOUR PHYSICIAN - GO DIRECTLY TO THE EMERGENCY ROOM.  dIalia Posada MD  11/19/2018 1:33:36 PM  This report has been verified and signed electronically.  PROVATION

## 2018-11-19 NOTE — TRANSFER OF CARE
"Anesthesia Transfer of Care Note    Patient: Isaak Webb    Procedure(s) Performed: Procedure(s) (LRB):  EGD (ESOPHAGOGASTRODUODENOSCOPY) (N/A)  COLONOSCOPY (N/A)    Patient location: PACU    Anesthesia Type: general    Transport from OR: Transported from OR on 6-10 L/min O2 by face mask with adequate spontaneous ventilation    Post pain: adequate analgesia    Post assessment: no apparent anesthetic complications and tolerated procedure well    Post vital signs: stable    Level of consciousness: responds to stimulation and sedated    Nausea/Vomiting: no nausea/vomiting    Complications: none    Transfer of care protocol was followed      Last vitals:   Visit Vitals  /64   Pulse 77   Temp 36.7 °C (98.1 °F)   Resp 14   Ht 5' 7" (1.702 m)   Wt 111.1 kg (245 lb)   SpO2 98%   BMI 38.37 kg/m²     "

## 2018-11-19 NOTE — PLAN OF CARE
Discharge instructions given and explained to pt and pt's spouse. Both verbalize understanding of instructions.

## 2018-11-19 NOTE — PROVATION PATIENT INSTRUCTIONS
Discharge Summary/Instructions after an Endoscopic Procedure  Patient Name: Isaak Webb  Patient MRN: [id number]  Patient YOB: 1970  Monday, November 19, 2018  Idalia Posada MD  RESTRICTIONS:  During your procedure today, you received medications for sedation.  These   medications may affect your judgment, balance and coordination.  Therefore,   for 24 hours, you have the following restrictions:   - DO NOT drive a car, operate machinery, make legal/financial decisions,   sign important papers or drink alcohol.    ACTIVITY:  Today: no heavy lifting, straining or running due to procedural   sedation/anesthesia.  The following day: return to full activity including work.  DIET:  Eat and drink normally unless instructed otherwise.     TREATMENT FOR COMMON SIDE EFFECTS:  - Mild abdominal pain, nausea, belching, bloating or excessive gas:  rest,   eat lightly and use a heating pad.  - Sore Throat: treat with throat lozenges and/or gargle with warm salt   water.  - Because air was used during the procedure, expelling large amounts of air   from your rectum or belching is normal.  - If a bowel prep was taken, you may not have a bowel movement for 1-3 days.    This is normal.  SYMPTOMS TO WATCH FOR AND REPORT TO YOUR PHYSICIAN:  1. Abdominal pain or bloating, other than gas cramps.  2. Chest pain.  3. Back pain.  4. Signs of infection such as: chills or fever occurring within 24 hours   after the procedure.  5. Rectal bleeding, which would show as bright red, maroon, or black stools.   (A tablespoon of blood from the rectum is not serious, especially if   hemorrhoids are present.)  6. Vomiting.  7. Weakness or dizziness.  GO DIRECTLY TO THE NEAREST EMERGENCY ROOM IF YOU HAVE ANY OF THE FOLLOWING:      Difficulty breathing              Chills and/or fever over 101 F   Persistent vomiting and/or vomiting blood   Severe abdominal pain   Severe chest pain   Black, tarry stools   Bleeding- more than one  tablespoon   Any other symptom or condition that you feel may need urgent attention  Your doctor recommends these additional instructions:  If any biopsies were taken, your doctors clinic will contact you in 1 to 2   weeks with any results.  - Discharge patient to home (with escort).   - Resume previous diet.   - Continue present medications.   - Await pathology results.   - Repeat colonoscopy in 3 years for surveillance.   - Return to my office as previously scheduled.   - The findings and recommendations were discussed with the patient.   - Patient has a contact number available for emergencies.  The signs and   symptoms of potential delayed complications were discussed with the   patient.  Return to normal activities tomorrow.  Written discharge   instructions were provided to the patient.   - -Because you had an advanced polyp, all your first degree-relatives   (mother, father, siblings, children) should see their primary care   physician or gastroenterologist to consider colonoscopy and to discuss   screening for colon cancer starting at 36 years of age if your polyps turn   out to be precancerous.  For questions, problems or results please call your physician - Idalia Posada MD at Work:  [phone].  [facility], EMERGENCY ROOM PHONE NUMBER: [phone]  IF A COMPLICATION OR EMERGENCY SITUATION ARISES AND YOU ARE UNABLE TO REACH   YOUR PHYSICIAN - GO DIRECTLY TO THE EMERGENCY ROOM.  Idalia Posada MD  11/19/2018 2:20:13 PM  This report has been verified and signed electronically.  PROVATION

## 2018-11-20 ENCOUNTER — PATIENT MESSAGE (OUTPATIENT)
Dept: HEPATOLOGY | Facility: CLINIC | Age: 48
End: 2018-11-20

## 2018-11-20 ENCOUNTER — PROCEDURE VISIT (OUTPATIENT)
Dept: HEPATOLOGY | Facility: CLINIC | Age: 48
End: 2018-11-20
Attending: NURSE PRACTITIONER
Payer: COMMERCIAL

## 2018-11-20 ENCOUNTER — HOSPITAL ENCOUNTER (OUTPATIENT)
Dept: RADIOLOGY | Facility: HOSPITAL | Age: 48
Discharge: HOME | End: 2018-11-20
Attending: NURSE PRACTITIONER
Payer: COMMERCIAL

## 2018-11-20 ENCOUNTER — OFFICE VISIT (OUTPATIENT)
Dept: HEPATOLOGY | Facility: CLINIC | Age: 48
End: 2018-11-20
Payer: COMMERCIAL

## 2018-11-20 VITALS
SYSTOLIC BLOOD PRESSURE: 126 MMHG | HEART RATE: 79 BPM | OXYGEN SATURATION: 96 % | DIASTOLIC BLOOD PRESSURE: 77 MMHG | BODY MASS INDEX: 39.65 KG/M2 | TEMPERATURE: 98 F | RESPIRATION RATE: 18 BRPM | WEIGHT: 252.63 LBS | HEIGHT: 67 IN

## 2018-11-20 DIAGNOSIS — E66.09 CLASS 2 OBESITY DUE TO EXCESS CALORIES WITH BODY MASS INDEX (BMI) OF 39.0 TO 39.9 IN ADULT, UNSPECIFIED WHETHER SERIOUS COMORBIDITY PRESENT: ICD-10-CM

## 2018-11-20 DIAGNOSIS — D75.1 POLYCYTHEMIA: ICD-10-CM

## 2018-11-20 DIAGNOSIS — I10 HYPERTENSION, UNSPECIFIED TYPE: ICD-10-CM

## 2018-11-20 DIAGNOSIS — R11.2 NAUSEA AND VOMITING, INTRACTABILITY OF VOMITING NOT SPECIFIED, UNSPECIFIED VOMITING TYPE: ICD-10-CM

## 2018-11-20 DIAGNOSIS — R10.12 ABDOMINAL PAIN, BILATERAL UPPER QUADRANT: ICD-10-CM

## 2018-11-20 DIAGNOSIS — R68.81 EARLY SATIETY: ICD-10-CM

## 2018-11-20 DIAGNOSIS — E11.9 TYPE 2 DIABETES MELLITUS WITHOUT COMPLICATION, WITHOUT LONG-TERM CURRENT USE OF INSULIN: ICD-10-CM

## 2018-11-20 DIAGNOSIS — R16.0 HEPATOMEGALY: ICD-10-CM

## 2018-11-20 DIAGNOSIS — K76.0 FATTY LIVER: Primary | ICD-10-CM

## 2018-11-20 DIAGNOSIS — R14.0 BLOATING: ICD-10-CM

## 2018-11-20 DIAGNOSIS — R10.11 ABDOMINAL PAIN, BILATERAL UPPER QUADRANT: ICD-10-CM

## 2018-11-20 DIAGNOSIS — K76.0 FATTY LIVER: ICD-10-CM

## 2018-11-20 PROCEDURE — 99204 OFFICE O/P NEW MOD 45 MIN: CPT | Mod: S$GLB,,, | Performed by: NURSE PRACTITIONER

## 2018-11-20 PROCEDURE — 78264 GASTRIC EMPTYING IMG STUDY: CPT | Mod: TC

## 2018-11-20 PROCEDURE — A9541 TC99M SULFUR COLLOID: HCPCS

## 2018-11-20 PROCEDURE — 78264 GASTRIC EMPTYING IMG STUDY: CPT | Mod: 26,,, | Performed by: RADIOLOGY

## 2018-11-20 PROCEDURE — 99999 PR PBB SHADOW E&M-EST. PATIENT-LVL V: CPT | Mod: PBBFAC,,, | Performed by: NURSE PRACTITIONER

## 2018-11-20 PROCEDURE — 3008F BODY MASS INDEX DOCD: CPT | Mod: CPTII,S$GLB,, | Performed by: NURSE PRACTITIONER

## 2018-11-20 NOTE — PROGRESS NOTES
I have personally performed a face to face diagnostic evaluation on this patient. I have reviewed and agree with today's findings and the care plan outlined by Beryl Nunes NP with following comments:     Isaak Webb is a pleasant 48 y.o. male who was referred for sonographic finding of hepatic steatosis and hepatomegaly. Viral hepatitis panel was negative. Liver enzymes: ALT 41. He has polycythemia. Prior iron saturation was unremarkable.    The patient's risk factors for NAFLD include:    · Obesity/overweight                     Yes (BMI 39)  · Dyslipidemia                                yes  · Diabetes                                      yes  · Family history of diabetes           no    Agree with obtaining VCTE for hepatic fibrosis/steatosis assessment and repeating iron studies for polycythemia to rule out hemochromatosis. If iron tests are abnormal, will obtain HFE gene analysis. If iron tests are again normal, he may benefit from seeing Hematology.    We have counseled the patient regarding the life-style modifications: weight loss, low calorie/low fat diet and exercise.

## 2018-11-20 NOTE — PROGRESS NOTES
[facility] VISIT NEW PT NOTE    REFERRING PROVIDER: Aysha Kaplan NP    CHIEF COMPLAINT: fatty liver    HPI: This is a 48 y.o. White male with PMH as below referred for evaluation of fatty liver. He is having workup with GI and had outside u/s that showed hepatomegaly with fatty liver. Risk factors for NAFLD include obesity, BMI 39, HTN, and DM. Does not have HLD. He denies any significant alcohol use. No family h/o liver disease. Labs indicate normal AST of 24, ALT 41. Tbili 1.1. Alb 4.1. Reports his Hgb A1C was 6.5. He has polycythemia, H/H 18.4/55.5. Plts nl 273. He does not smoke. Hep B and C negative. He is not immune to hepatitis A or B per labs. Reports he's taken these in the past with his work. He denies any symptoms of advanced chronic liver disease including jaundice, dark urine, hematemesis, melena, slowed mentation, abdominal distention.            Review of patient's allergies indicates:  No Known Allergies    Current Outpatient Medications on File Prior to Visit   Medication Sig Dispense Refill    ascorbic acid, vitamin C, (VITAMIN C) 1000 MG tablet Vitamin C      cetirizine (ZYRTEC) 10 mg Cap Zyrtec 10 mg capsule   Take by oral route.      empagliflozin-linagliptin (GLYXAMBI) 25-5 mg Tab Glyxambi 25 mg-5 mg tablet   Take 1 tablet every day by oral route.      ergocalciferol (ERGOCALCIFEROL) 50,000 unit Cap Vitamin D2 50,000 unit capsule   Take 1 capsule every week by oral route.      losartan (COZAAR) 100 MG tablet losartan 100 mg tablet   Take 1 tablet every day by oral route.      multivit with minerals/lutein (MULTIVITAMIN 50 PLUS ORAL) multivitamin      omega 3-dha-epa-fish oil (FISH OIL) 100-160-1,000 mg Cap Take 1,000 mg by mouth once daily.      pantoprazole (PROTONIX) 40 MG tablet pantoprazole 40 mg tablet,delayed release   Take 1 tablet every day by oral route.      testosterone cypionate (DEPOTESTOTERONE CYPIONATE) 100 mg/mL injection Inject 200 mg/mL as directed.       [DISCONTINUED] promethazine (PHENERGAN) 25 MG tablet promethazine 25 mg tablet   Take 1 tablet every 6 hours by oral route.      flu vaccine ts 2016-17,4yr up, (FLUVIRIN 2179-1161) 45 mcg (15 mcg x 3)/0.5 mL Susp Fluvirin 6500-9599 45 mcg (15 mcg x 3)/0.5 mL intramuscular suspension   ADM 0.5ML IM UTD      FLUZONE QUAD 7812-7310, PF, 60 mcg (15 mcg x 4)/0.5 mL Syrg ADM 0.5ML IM UTD  0     Current Facility-Administered Medications on File Prior to Visit   Medication Dose Route Frequency Provider Last Rate Last Dose    [DISCONTINUED] 0.9%  NaCl infusion   Intravenous Continuous Idalia Posada MD 10 mL/hr at 11/19/18 1256      [DISCONTINUED] 0.9%  NaCl infusion   Intravenous Continuous PRN Aida S. Faneca, CRNA   Stopped at 11/19/18 1413    [DISCONTINUED] etomidate injection   Intravenous PRN Aida S. Faneca, CRNA   4 mg at 11/19/18 1411    [DISCONTINUED] glycopyrrolate injection    PRN Aida S. Faneca, CRNA   0.2 mg at 11/19/18 1315    [DISCONTINUED] lidocaine (cardiac) injection    PRN Aida S. Faneca, CRNA   100 mg at 11/19/18 1317    [DISCONTINUED] propofol (DIPRIVAN) 10 mg/mL infusion    PRN Aida S. Faneca, CRNA   20 mg at 11/19/18 1321    [DISCONTINUED] propofol (DIPRIVAN) 10 mg/mL infusion    Continuous PRN Aida S. Faneca, CRNA   Stopped at 11/19/18 1407    [DISCONTINUED] sodium chloride 0.9% flush 3 mL  3 mL Intravenous PRN Idalia Posada MD           PMHX:  has a past medical history of Cholelithiasis and GERD (gastroesophageal reflux disease).    PSHX:  has a past surgical history that includes Cholecystectomy; Upper gastrointestinal endoscopy; and esophagus dilation .    FAMILY HISTORY: Negative for liver disease, reviewed in [facility]    SOCIAL HISTORY:   Social History     Tobacco Use   Smoking Status Never Smoker   Smokeless Tobacco Never Used       Social History     Substance and Sexual Activity   Alcohol Use No    Frequency: Never       Social History     Substance and Sexual Activity   Drug Use  "No     . 3 grown children. Works in IT.     ROS:   GENERAL: Denies fever, chills, weight loss/gain, fatigue  HEENT: Denies headaches, dizziness, vision/hearing changes  CARDIOVASCULAR: Denies chest pain, palpitations, or edema  RESPIRATORY: Denies dyspnea, cough  GI: (+) abdominal pain, no rectal bleeding, (+) nausea, (+) vomiting. No change in bowel pattern or color  : Denies dysuria, hematuria   SKIN: Denies rash, itching   NEURO: Denies confusion, memory loss, or mood changes  PSYCH: Denies depression or anxiety  HEME/LYMPH: Denies easy bruising or bleeding      PHYSICAL EXAM:   Friendly White male, in no acute distress; alert and oriented to person, place and time  VITALS: /77 (BP Location: Left arm, Patient Position: Sitting)   Pulse 79   Temp 97.8 °F (36.6 °C) (Oral)   Resp 18   Ht 5' 7" (1.702 m)   Wt 114.6 kg (252 lb 10.4 oz)   SpO2 96%   BMI 39.57 kg/m²   HENT: Normocephalic, without obvious abnormality. Oral mucosa pink and moist. Dentition good.  EYES: Sclerae anicteric. No conjunctival pallor.   NECK: Supple. No masses or cervical adenopathy.  CARDIOVASCULAR: Regular rate and rhythm. No murmurs.  RESPIRATORY: Normal respiratory effort. BBS CTA. No wheezes or crackles.  GI: Obese, soft, non-tender, non-distended. No palpable hepatosplenomegaly. No masses palpable. No ascites.  EXTREMITIES:  No clubbing, cyanosis or edema.  SKIN: Warm and dry. No jaundice. No rashes noted to exposed skin. No telangectasias noted. No palmar erythema.  NEURO:  Normal gate. No asterixis.  PSYCH:  Memory intact. Thought and speech pattern appropriate. Behavior normal. No depression or anxiety noted.      RECENT LABS:    Labs:  Lab Results   Component Value Date    WBC 12.37 10/16/2018    HGB 18.4 (H) 10/16/2018    HCT 55.5 (H) 10/16/2018     10/16/2018     10/16/2018    K 4.1 10/16/2018    CREATININE 0.8 10/16/2018    ALT 41 10/16/2018    AST 24 10/16/2018    ALKPHOS 55 10/16/2018    BILITOT " 1.1 (H) 10/16/2018    ALBUMIN 4.1 10/16/2018       DIAGNOSTIC STUDIES:  ABD. U/S- 8/22/18 - hepatomegaly at 17.6 cm, with steatosis. Spleen nl.       ASSESSMENT:  48 y.o. White male with:  1.  NAFLD  -- transaminases essentially normal  -- US shows hepatomegaly with steatosis  -- synthetic liver function - Tbili 1.1, alb 4.1, no INR  -- risk factors for fatty liver include obesity, BMI 39, HTN, DM  -- not immune to hep A or B  2. Polycythemia  -- will check ferritin and iron/TIBC   -- denies significant alcohol intake  -- does not smoke      EDUCATION:   We discussed the manifestations of NAFLD. At this time there is no FDA approved therapy for NAFLD.   The patient and I discussed the importance of diet, exercise, and weight loss for management of NAFLD. We discussed a low fat, low carb/low sugar, high fiber diet, and a goal of 30 minutes of exercise 5 times per week.   Pt is aware that fatty liver can progress to steatohepatitis and possibly to cirrhosis, putting one at increased risk for liver cancer, liver failure, and death.        PLAN:  1. Labs today  2. Fibroscan. Pt could not do today since he ate right before appt  3. Twinrix vaccines, order given to pt to be done locally  4. Weight loss goal of 15-20 lbs  5. Low fat, low cholesterol, low carb, high fiber, high protein diet  6. Exercise, 5 days a week, 30 min a day  7. Recommend good control of cholesterol, blood pressure, blood sugar levels  8. Follow up pending results       Thank you for allowing me to participate in the care of LEXI Coy

## 2018-11-20 NOTE — LETTER
November 20, 2018      Aysha Smith NP  [address]  [facility] [address]           [facility] - Hepatology  [address]  [facility] [address]  Phone: [phone]  Fax: [phone]          Patient: Isaak Webb   MR Number: [id number]   YOB: 1970   Date of Visit: 11/20/2018       Dear Aysha Smith:    Thank you for referring Isaak Webb to me for evaluation. Attached you will find relevant portions of my assessment and plan of care.    If you have questions, please do not hesitate to call me. I look forward to following Isaak Webb along with you.    Sincerely,    Beryl Nunes NP    Enclosure  CC:  No Recipients    If you would like to receive this communication electronically, please contact [email or web address] or [phone] to request more information on [employer] access.    For providers and/or their staff who would like to refer a patient to [facility], please contact us through our one-stop-shop provider referral line, [facility], at [phone].    If you feel you have received this communication in error or would no longer like to receive these types of communications, please e-mail [email or web address]

## 2018-11-26 ENCOUNTER — TELEPHONE (OUTPATIENT)
Dept: ENDOSCOPY | Facility: HOSPITAL | Age: 48
End: 2018-11-26

## 2018-11-29 ENCOUNTER — PATIENT MESSAGE (OUTPATIENT)
Dept: HEPATOLOGY | Facility: CLINIC | Age: 48
End: 2018-11-29

## 2018-11-29 DIAGNOSIS — R17 SERUM TOTAL BILIRUBIN ELEVATED: Primary | ICD-10-CM

## 2018-11-29 NOTE — TELEPHONE ENCOUNTER
Will repeat labs with appts on 12/17. Please schedule lab for hepatic panel, retic count. Pt notified via [facility]

## 2018-12-10 ENCOUNTER — TELEPHONE (OUTPATIENT)
Dept: GASTROENTEROLOGY | Facility: CLINIC | Age: 48
End: 2018-12-10

## 2018-12-10 RX ORDER — PANTOPRAZOLE SODIUM 40 MG/1
40 TABLET, DELAYED RELEASE ORAL 2 TIMES DAILY
Qty: 180 TABLET | Refills: 3 | Status: SHIPPED | OUTPATIENT
Start: 2018-12-10 | End: 2019-03-17

## 2018-12-10 NOTE — TELEPHONE ENCOUNTER
PATH:    Please let the pt know that pathology from recent procedure shows:    One pre-cancerous polyp. These type of polyps may develop into cancer if not removed. The polyp(s) were removed.      One advanced (1cm) pre-cancerous polyp.  These polyps have a higher risk of transforming into cancer.  Removed.      Benign polyps in rectum. These type of polyps do not develop into cancer.     High eosinophils in esophagus    No other significant abnormality    Will need repeat colonoscopy in 3 years     Because he/she had an advanced polyp, all his/her first degree-relatives (mother, father, siblings, children) should see their primary care physician or gastroenterologist to consider colonoscopy and to discuss screening for colon cancer starting at 40 years of age or 10 years prior to his/her age at the time of this colonoscopy.       Endoscopy and colonoscopy is not a perfect exam of the colon. Rarely a significant polyp or colon cancer can be missed. He/she should not ignore symptoms such as blood with bowel movements or abdominal pain and report these symptoms to the doctor if they occur.     Should start pantoprazole 40mg BID     Should follow up with Dr. Posada next available

## 2018-12-11 NOTE — TELEPHONE ENCOUNTER
Pt given pathology from recent procedure. Pt verbalized understanding. Pt has f/u arranged previously this upcoming Monday.

## 2018-12-17 ENCOUNTER — OFFICE VISIT (OUTPATIENT)
Dept: GASTROENTEROLOGY | Facility: CLINIC | Age: 48
End: 2018-12-17
Payer: COMMERCIAL

## 2018-12-17 ENCOUNTER — PROCEDURE VISIT (OUTPATIENT)
Dept: HEPATOLOGY | Facility: CLINIC | Age: 48
End: 2018-12-17
Attending: NURSE PRACTITIONER
Payer: COMMERCIAL

## 2018-12-17 ENCOUNTER — NUTRITION (OUTPATIENT)
Dept: GASTROENTEROLOGY | Facility: CLINIC | Age: 48
End: 2018-12-17

## 2018-12-17 ENCOUNTER — PATIENT MESSAGE (OUTPATIENT)
Dept: HEPATOLOGY | Facility: CLINIC | Age: 48
End: 2018-12-17

## 2018-12-17 ENCOUNTER — HOSPITAL ENCOUNTER (OUTPATIENT)
Dept: RADIOLOGY | Facility: HOSPITAL | Age: 48
Discharge: HOME | End: 2018-12-17
Attending: NURSE PRACTITIONER
Payer: COMMERCIAL

## 2018-12-17 VITALS
DIASTOLIC BLOOD PRESSURE: 87 MMHG | WEIGHT: 252.63 LBS | HEIGHT: 67 IN | HEART RATE: 95 BPM | BODY MASS INDEX: 39.65 KG/M2 | SYSTOLIC BLOOD PRESSURE: 114 MMHG

## 2018-12-17 VITALS — WEIGHT: 253.31 LBS | HEIGHT: 67 IN | BODY MASS INDEX: 39.76 KG/M2

## 2018-12-17 DIAGNOSIS — R14.0 BLOATING: ICD-10-CM

## 2018-12-17 DIAGNOSIS — K76.0 FATTY LIVER: Primary | ICD-10-CM

## 2018-12-17 DIAGNOSIS — R10.9 ABDOMINAL PAIN, UNSPECIFIED ABDOMINAL LOCATION: ICD-10-CM

## 2018-12-17 DIAGNOSIS — K76.0 FATTY LIVER: ICD-10-CM

## 2018-12-17 DIAGNOSIS — K21.9 GASTROESOPHAGEAL REFLUX DISEASE WITHOUT ESOPHAGITIS: ICD-10-CM

## 2018-12-17 DIAGNOSIS — K21.9 GASTROESOPHAGEAL REFLUX DISEASE WITHOUT ESOPHAGITIS: Primary | ICD-10-CM

## 2018-12-17 DIAGNOSIS — R11.2 NAUSEA AND VOMITING, INTRACTABILITY OF VOMITING NOT SPECIFIED, UNSPECIFIED VOMITING TYPE: ICD-10-CM

## 2018-12-17 DIAGNOSIS — R68.81 EARLY SATIETY: ICD-10-CM

## 2018-12-17 DIAGNOSIS — K20.0 EOSINOPHILIC ESOPHAGITIS: ICD-10-CM

## 2018-12-17 PROCEDURE — 99214 OFFICE O/P EST MOD 30 MIN: CPT | Mod: S$GLB,,, | Performed by: NURSE PRACTITIONER

## 2018-12-17 PROCEDURE — 91200 LIVER ELASTOGRAPHY: CPT | Mod: S$GLB,,, | Performed by: NURSE PRACTITIONER

## 2018-12-17 PROCEDURE — 3008F BODY MASS INDEX DOCD: CPT | Mod: CPTII,S$GLB,, | Performed by: NURSE PRACTITIONER

## 2018-12-17 PROCEDURE — 74181 MRI ABDOMEN W/O CONTRAST: CPT | Mod: 26,,, | Performed by: RADIOLOGY

## 2018-12-17 PROCEDURE — 99999 PR PBB SHADOW E&M-EST. PATIENT-LVL II: CPT | Mod: PBBFAC,,,

## 2018-12-17 PROCEDURE — 99999 PR PBB SHADOW E&M-EST. PATIENT-LVL IV: CPT | Mod: PBBFAC,,, | Performed by: NURSE PRACTITIONER

## 2018-12-17 PROCEDURE — 74181 MRI ABDOMEN W/O CONTRAST: CPT | Mod: TC

## 2018-12-17 NOTE — PATIENT INSTRUCTIONS
Continue protonix 40 mg twice daily.    Continue IBgard as needed    Continue compazine 10 mg three times daily as needed.    Continue the gastroparesis diet.

## 2018-12-17 NOTE — PROGRESS NOTES
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 Would consume 6-8 each day at work in place of water     Previous Medical History:  Past Medical History:   Diagnosis Date    Cholelithiasis     Diabetes mellitus     GERD (gastroesophageal reflux disease)     Hypertension        Previous Surgical History:  Past Surgical History:   Procedure Laterality Date    CHOLECYSTECTOMY      COLONOSCOPY N/A 11/19/2018    Procedure: COLONOSCOPY;  Surgeon: Idalia Posada MD;  Location: [address]);  Service: Endoscopy;  Laterality: N/A;  5 days full liquid/1 day clear liquid (telephone encounter 10/16/18) - sm    COLONOSCOPY N/A 11/19/2018    Performed by Idalia Posada MD at [facility] ([address] [place] [address])    EGD (ESOPHAGOGASTRODUODENOSCOPY) N/A 11/19/2018    Performed by Idalia Posada MD at [address])    ESOPHAGOGASTRODUODENOSCOPY N/A 11/19/2018    Procedure: EGD (ESOPHAGOGASTRODUODENOSCOPY);  Surgeon: Idalia Posada MD;  Location: [address]);  Service: Endoscopy;  Laterality: N/A;  5 days full liquid/1 day clear liquid (telephone encounter 10/16/18) - sm    esophagus dilation       UPPER GASTROINTESTINAL ENDOSCOPY      VASECTOMY         Medication:     Current Outpatient Medications:     cetirizine (ZYRTEC) 10 mg Cap,  Zyrtec 10 mg capsule  Take by oral route., Disp: , Rfl:     empagliflozin-linagliptin (GLYXAMBI) 25-5 mg Tab, Glyxambi 25 mg-5 mg tablet  Take 1 tablet every day by oral route., Disp: , Rfl:     ergocalciferol (ERGOCALCIFEROL) 50,000 unit Cap, Vitamin D2 50,000 unit capsule  Take 1 capsule every week by oral route., Disp: , Rfl:     losartan (COZAAR) 100 MG tablet, losartan 100 mg tablet  Take 1 tablet every day by oral route., Disp: , Rfl:     multivit with minerals/lutein (MULTIVITAMIN 50 PLUS ORAL), multivitamin, Disp: , Rfl:     omega 3-dha-epa-fish oil (FISH OIL) 100-160-1,000 mg Cap, Take 1,000 mg by mouth once daily., Disp: , Rfl:     pantoprazole (PROTONIX) 40 MG tablet, Take 1 tablet (40 mg total) by mouth 2 (two) times daily., Disp: 180 tablet, Rfl: 3    testosterone cypionate (DEPOTESTOTERONE CYPIONATE) 100 mg/mL injection, Inject 200 mg/mL as directed., Disp: , Rfl:       Vitamin/Supplements/Herbs: Reports Vit C 1000 mg per day; omega three , Multivitamin     Potential interactions with food    Allergies: food allergy testing - + for soy, peanuts, beef   Review of patient's allergies indicates:  No Known Allergies    Labs:Last Labs:  Last Labs:  Glucose   Date Value Ref Range Status   11/20/2018 147 (H) 70 - 110 mg/dL Final   10/16/2018 126 (H) 70 - 110 mg/dL Final     BUN, Bld   Date Value Ref Range Status   11/20/2018 11 6 - 20 mg/dL Final   10/16/2018 7 6 - 20 mg/dL Final     Creatinine   Date Value Ref Range Status   11/20/2018 0.9 0.5 - 1.4 mg/dL Final   10/16/2018 0.8 0.5 - 1.4 mg/dL Final     Sodium   Date Value Ref Range Status   11/20/2018 141 136 - 145 mmol/L Final   10/16/2018 138 136 - 145 mmol/L Final     Potassium   Date Value Ref Range Status   11/20/2018 3.8 3.5 - 5.1 mmol/L Final   10/16/2018 4.1 3.5 - 5.1 mmol/L Final     No results found for: PHOS  Calcium   Date Value Ref Range Status   11/20/2018 9.0 8.7 - 10.5 mg/dL Final   10/16/2018 10.0 8.7 - 10.5 mg/dL Final     No  "results found for: PREALBUMIN  Total Protein   Date Value Ref Range Status   2018 7.4 6.0 - 8.4 g/dL Final   2018 6.9 6.0 - 8.4 g/dL Final     No results found for: CHOL  No results found for: HGBA1C  Hemoglobin   Date Value Ref Range Status   2018 16.3 14.0 - 18.0 g/dL Final   10/16/2018 18.4 (H) 14.0 - 18.0 g/dL Final     Hematocrit   Date Value Ref Range Status   2018 49.1 40.0 - 54.0 % Final   10/16/2018 55.5 (H) 40.0 - 54.0 % Final     Iron   Date Value Ref Range Status   2018 54 45 - 160 ug/dL Final   10/16/2018 65 45 - 160 ug/dL Final     No components found for: FROLATE  No results found for: [id number]  WBC   Date Value Ref Range Status   2018 11.32 3.90 - 12.70 K/uL Final   10/16/2018 12.37 3.90 - 12.70 K/uL Final       : 1970  Age: 48 y.o.    Anthropometrics    Estimated body mass index is 39.67 kg/m² as calculated from the following:    Height as of this encounter: 5' 7" (1.702 m).    Weight as of this encounter: 114.9 kg (253 lb 4.9 oz).    For Adults 20 Years and Older:    BMI Weight Status   Below 18.5 Underweight   18.6-24.9 Normal/Healthy   25.0-29.9 Overweight   30.0 & Above Obese     Ideal Weight Range for Your Height: 5'7" = 121 - 158 lbs    Weight History:  Wt Readings from Last 12 Encounters:   18 114.6 kg (252 lb 10.4 oz)   18 114.9 kg (253 lb 4.9 oz)   18 114.6 kg (252 lb 10.4 oz)   18 111.1 kg (245 lb)   10/16/18 112.8 kg (248 lb 10.9 oz)   ]      Weight Changes/Trend:   has increased 4 pounds over last 2 months  Consistently increasing, gained 3-5 pounds in the past 4-6 weeks      Estimated Nutrition Needs:   Energy Needs:  (MSJ x  PAL)2000 calories   Protein Needs: ( gm Protein/kg)100 grams lean protein   Fluid Needs:   (1 mL/calorie) 2000cc    Nutrition Focused Physical Findings:   Well Nourished. No Malnutrition Focus Physical findings present on exam.    Gastrointestinal Habits:  Dysphagia with dry /low moisture foods "   Belching post consumption of carbonated drinks   Hx of EOE treated with corticosteroids with improvement - avoids beef as it tends to be worse with this food   Allergy testing + for soy and peanuts        Nutrition History Has lost 35# since onset of esophagitis /dysphagia due to elimination of night meal and substitution of smoothie     Meal Pattern: meals per day  Breakfast:   Lunch:  Dinner:  Snacks:  Beverage Intake: carbonated beverages primarily     Meal Preparation: wife   Dining Out: for lunch     Dentition: Difficulty chewing or swallowing?   Activity Level/Physical Limitations : Sedentary     Motivation to make Changes :   action - ready to set action plan and implement reduction in fat intake to address fatty liver and choice of lean protein sources ; meal replacement to assist with weight loss efforts     Anticipated barriers to making changes and/ or expected compliance Food and nutrition-related knowledge deficit and Physicial inactivity    Nutrition Diagnosis  Nutrition Problem  Altered GI Function and Diabetes   Preference for high fat foods and fast pace of eating   Related to (etiology):   GERD and Dysphagia ; possible EOE- triggered by intake of beef   Gastroparesis     Signs and Symptoms (as evidenced by):    delayed emptying of foods and ifficulty swallowing dry boluses   Nutrition Diagnosis Status:   New      Recommendations/Interventions/Goals:  decrease soda or juice intake, increase physical activity, increase water intake, reduce fast food intake, reduce portion size and reduce intake of high fat foods   caffeine reduction, dietary changes, weight loss      Recommended modifications  Protein-modified diet, Fat-modified diet and Specific foods/beverages or groups   Provided with [facility] GI Nutrition - Gastroparesis and Diabetes  Guidelines for lean protein sources ( using guideline from Bariatric Bible) and liquid  meal replacements   Guidelines for sources of fat and ways to  modify intake in order to improve fatty liver symptoms       Comprehension: of recommendations :   Understands the need to moisten boluses before swallowing with fat free condiment or prepare in moist environment ( crock pot) without additional fat   Reviewed guidelines for GE Reflux and foods/beveraes to avoid       Monitoring : symptom frequency change ; weight , labs     Routed to Aysha Smith     Consultation Time:60 minutes.      Follow Up:2 months.

## 2018-12-17 NOTE — PROCEDURES
Fibroscan Procedure     Name: Isaak Webb  Date of Procedure : 2018   :: Beryl Nunes NP  Diagnosis: NAFLD    Probe: XL    Fibroscan readin.4 KPa    Fibrosis:F3     CAP readin dB/m    Steatosis: :<S1

## 2019-09-07 NOTE — PROGRESS NOTES
[facility] Consultation Note    Reason for Consult:    Chief Complaint   Patient presents with    Heartburn    Nausea     meds help          PCP:   Jamia Mckeon       Referring MD:  No referring provider defined for this encounter.     Current GI: /Janna Rodriguez NP      HPI:  Isaak Webb is a 48 y.o. male with a PMH of DM 2, HTN, s/p russell  referred to motility clinic for second opinion regarding the following problems:    GERD. Better. On pantoprazole 40 mg BID. S/s occurring once weekly.  Retrosternal pyrosis:yes  Regurgitation:sometimes  Belching:yes  Onset: several years  Frequency: couple days weekly  Improve with:  Some improvement with pantoprazole 40 mg once daily. Minimal improvement with nexium and prilosec. Cannot recall if dexilant helped. Has not tied prevacid or aciphex  Upright symptoms:no   Nocturnal symptoms:  yes  Hoarseness:no  Cough:no  Throat clearing:yes  Food Triggers:none  Caffeine intake:6-8 coke zero/day  Was sleeping with head of the bed elevated.   Avoids eating prior to bedtime.      EOE. Diagnosed 10 years ago. Recent EGD with up to 160 eos per HPF.  H/o swallowed fluticasone several years ago. Now on pantoprazole BID    H/o dysphagia. None since several months ago. On protonix 40 mg BID.    Nausea.  Some improvement with compazine 10 mg PRN- 3-4 times since last visit. Some improvement with IBgard.  Frequency:short lived, occurring couple days monthly  Onset: summer 2017    Early satiety.  Frequency:daily  Onset:summer 2017    Vomiting.   Frequency:None since last visit.  Previously couple days monthly  Onset:summer 2017  Timing of vomiting:  Within 30 min of eating:no  Within 3 hours after eating: yes       Cyclical episodes of n/v with symptom free intervals between episodes:yes. Would go couple months b/t episodes of severe n/v for few days at s/s onset. More constant nausea recently.   Prodrome:belching  History of  migraines:no PH, mother with migraines  Marijuana use:no  Unusual bathing behaviors:no    Improves with:  Some improvement with IBgard, GP diet, and compazine  No  improvement with zofran 4 mg q 4-6 hrs   Some improvement with phenergan 25 mg suppositories PRN, but sedates him  Has not tried  reglan, domperidone, scopolamine patch      Gastroparesis diagnosed: June 2018 based on s/s. Recent GES normal    Etiology:    Dm-yes   Idopathic  Postsurgical-no  Parkinsonism-no  Amyloidosis-no  Paraneoplastic disease-no  Scleroderma-no  Mesenteric ischemia-no    History of:  Prior gastric or bariatric surgery: no  Diabetes mellitus: no  Thyroid dysfunction: no  Neurological disease: no   Autoimmune disorders: no    Number of GP related hospitalization in the past year: none  Number of GP related ED visit in the past year: none    Interventions: (pyloroplasty, botox, gastric stimulator, gastroparesis diet): on GP diet. Has seen GI dietitian today.   Curenlty on following medications that may affect gastric emptying:   Narcotics (morphine, oxycodone, tapentadol, less with tramadol):no  Anticholinergics: no  Cyclosporine:no  Diabetic medications (GLP-1 analogs, Exenatide, Lixisenatide, Livaglutide, Albiglutide, DulaglutatidePramlintide - SymlinPen (injection):none    DM 2. Onset: couple years ago. fasting BS about 120. HbA1c 6.5 few months ago. Taking glyxambi 25-5 mg daily x 1 year. Per pcp. H/o metformin, injections in the past.    Abdominal pain. Reports epigastric tenderness ie if brushes up against desk at work. Otherwise better. No postprandial pain since last visit. Some improvement with IBgard.   Has not tried FDgard, Bentyl, Levsin, Levbid.  Antidepressants:no  Using narcotic pain medication: no   NSAIDs:ibuprofen couple days weekly for HAs    Gas and bloating. No Distention. Some improvement since last visit. Is on IBgard. No major improvement with lactose elimination. Avoids artificial sugars.  Symptoms get worse  after meals:yes  Symptoms get worse as the day progresses:  yes  Consumes lactose:yes  Consumes artificial sugars:yes    Black stools. Resolved since last visit.     Weight loss. Lost 30 lbs since 7/2018. Weight stable since last visit.    Fatty liver. Hepatomegaly on abdominal us. Elevated bilirubin. Has seen hepatologist. fibro scan this morning with F3 fibrosis and < s1 steatosis. MRI electrography today with 25% steatosis and normal or no fibrosis.    Denies dysphagia,  diarrhea, constipation, BRBPR, anxiety/depression, insomnia.       Total visit time was 40 minutes, more than 50% of which was spent in face-to-face counseling with patient regarding symptoms, diagnostic results, prognosis, risks and benefits of treatment options, instructions for management, importance of compliance with chosen treatment options, risk factor reduction, stress reduction, coping strategies.      Previous Studies:   EGD 11/19/18: esophageal mucosal changes suggestive of EOE (up to 160 Eos per HPF). Gastric erythema (chr inactive gastritis). Nl duodenum (-).   Colon 11/19/18:GPTTI. Med IH. 10 mm DC polyp (TA). 8 mm DC polyp (TA). multiple 3-5 mm rectal polyps (hp). r/l colon bx (-). Rpt 3 yrs.  GES 10/16/18: NL. 6 % ret at 4 hrs  EGD 7/2/18: nl esophagus (reflux esophagitis). Stomach nl (chr gastritis). Nl duodenum (mild peptic changes).  Ct abd/pelvis 4/4/18: nl s/p russell  Abd us 3/22/18: limited by body habitus. s/p russell. Hepatomegaly w/ fatty liver  EGD 5/31/11: mid esophageal rings (inflammation w/ numerous eosinophils c/w EOE similar to 2007). Nl stomach. (mild chr gastritis) nl duodenum.     Labs:   5/11/2018: cmp nl, wbc high 10.6, RDW high 16.9  TTG/IgA-    Relevant surgeries  Russell (20 yrs ago)    ROS:  ROS   Constitutional: No fevers, no chills, + night sweats, + weight loss  ENT: No congestion, no rhinorrhea, no chronic sinus problems  CV: No chest pain, no palpitations  Pulm: No cough, no shortness of breath  Ophtho: No  blurry vision, no eye redness  GI: see HPI  Derm: No rash  Heme: No lymphadenopathy, no bruising  MSK: No joint pain, no joint swelling, no Raynauds  : No dysuria, no frequent urination, no blood in urine  Endo: No hot or cold intolerance  Neuro: No dizziness, no syncope, no seizure  Psych: No anxiety, no depression        Medical History:   Past Medical History:   Diagnosis Date    Cholelithiasis     Diabetes mellitus     GERD (gastroesophageal reflux disease)     Hypertension         Surgical History:   Past Surgical History:   Procedure Laterality Date    CHOLECYSTECTOMY      COLONOSCOPY N/A 11/19/2018    Procedure: COLONOSCOPY;  Surgeon: Idalia Posada MD;  Location: [facility] ([address] [place] [address]);  Service: Endoscopy;  Laterality: N/A;  5 days full liquid/1 day clear liquid (telephone encounter 10/16/18) - sm    COLONOSCOPY N/A 11/19/2018    [facility]    EGD (ESOPHAGOGASTRODUODENOSCOPY) N/A 11/19/2018    [facility]    ESOPHAGOGASTRODUODENOSCOPY N/A 11/19/2018    Procedure: EGD (ESOPHAGOGASTRODUODENOSCOPY);  Surgeon: Idalia Posada MD;  Location: [facility] ([address] [place] [address]);  Service: Endoscopy;  Laterality: N/A;  5 days full liquid/1 day clear liquid (telephone encounter 10/16/18) - sm    esophagus dilation       UPPER GASTROINTESTINAL ENDOSCOPY      VASECTOMY          Family History:   Family History   Problem Relation Age of Onset    Melanoma Father     Skin cancer Maternal Uncle     Celiac disease Neg Hx     Cirrhosis Neg Hx     Colon cancer Neg Hx     Colon polyps Neg Hx     Crohn's disease Neg Hx     Cystic fibrosis Neg Hx     Esophageal cancer Neg Hx     Hemochromatosis Neg Hx     Irritable bowel syndrome Neg Hx     Inflammatory bowel disease Neg Hx     Liver cancer Neg Hx     Liver disease Neg Hx     Rectal cancer Neg Hx     Stomach cancer Neg Hx     Ulcerative colitis Neg Hx     Jerry's disease  Neg Hx     Lymphoma Neg Hx     Tuberculosis Neg Hx     Scleroderma Neg Hx     Rheum arthritis Neg Hx     Multiple sclerosis Neg Hx     Lupus Neg Hx     Psoriasis Neg Hx         Social History:   Social History     Socioeconomic History    Marital status:      Spouse name: None    Number of children: None    Years of education: None    Highest education level: None   Social Needs    Financial resource strain: None    Food insecurity - worry: None    Food insecurity - inability: None    Transportation needs - medical: None    Transportation needs - non-medical: None   Occupational History    None   Tobacco Use    Smoking status: Never Smoker    Smokeless tobacco: Never Used   Substance and Sexual Activity    Alcohol use: No     Frequency: Never    Drug use: No    Sexual activity: None   Other Topics Concern    None   Social History Narrative    None        Review of patient's allergies indicates:  No Known Allergies    Current Outpatient Medications   Medication Sig Dispense Refill    cetirizine (ZYRTEC) 10 mg Cap Zyrtec 10 mg capsule   Take by oral route.      empagliflozin-linagliptin (GLYXAMBI) 25-5 mg Tab Glyxambi 25 mg-5 mg tablet   Take 1 tablet every day by oral route.      ergocalciferol (ERGOCALCIFEROL) 50,000 unit Cap Vitamin D2 50,000 unit capsule   Take 1 capsule every week by oral route.      losartan (COZAAR) 100 MG tablet losartan 100 mg tablet   Take 1 tablet every day by oral route.      multivit with minerals/lutein (MULTIVITAMIN 50 PLUS ORAL) multivitamin      omega 3-dha-epa-fish oil (FISH OIL) 100-160-1,000 mg Cap Take 1,000 mg by mouth once daily.      pantoprazole (PROTONIX) 40 MG tablet Take 1 tablet (40 mg total) by mouth 2 (two) times daily. 180 tablet 3    testosterone cypionate (DEPOTESTOTERONE CYPIONATE) 100 mg/mL injection Inject 200 mg/mL as directed.       No current facility-administered medications for this visit.         Objective  "Findings:  Vital Signs:  /87   Pulse 95   Ht 5' 7" (1.702 m)   Wt 114.6 kg (252 lb 10.4 oz)   BMI 39.57 kg/m²   Body mass index is 39.57 kg/m².    Physical Exam:  General appearance: alert, cooperative, no distress  HENT: Normocephalic, atraumatic, neck symmetrical, no nasal discharge  Eyes: conjunctivae/corneas clear, PERRL, EOM's intact  Lungs: clear to auscultation bilaterally, no dullness to percussion bilaterally  Heart: regular rate and rhythm without rub; no displacement of the PMI  Abdomen: soft, non-tender; bowel sounds normoactive; no organomegaly  Extremities: extremities symmetric; no clubbing, cyanosis, or edema  Integument: Skin color, texture, turgor normal; no rashes; hair distrubution normal  Neurologic: Alert and oriented X 3, normal strength, normal coordination and gait  Psychiatric: no pressured speech; normal affect; no evidence of impaired cognition    Labs:  Lab Results   Component Value Date    WBC 11.32 11/20/2018    HGB 16.3 11/20/2018    HCT 49.1 11/20/2018    MCV 89 11/20/2018     11/20/2018     Lab Results   Component Value Date    FERRITIN 137 11/20/2018     Lab Results   Component Value Date     11/20/2018    K 3.8 11/20/2018     11/20/2018    CO2 28 11/20/2018     (H) 11/20/2018    BUN 11 11/20/2018    CREATININE 0.9 11/20/2018    CALCIUM 9.0 11/20/2018    PROT 7.4 12/17/2018    ALBUMIN 3.9 12/17/2018    BILITOT 1.3 (H) 12/17/2018    ALKPHOS 54 (L) 12/17/2018    AST 29 12/17/2018    ALT 43 12/17/2018     Lab Results   Component Value Date    TSH 2.256 10/16/2018     No results found for: SEDRATE  No results found for: CRP  No results found for: LABA1C, HGBA1C        Assessment and Plan:  Isaak Webb is a 48 y.o. male with a PMH of DM 2, HTN, s/p russell  referred to motility clinic for second opinion regarding the following problems:    GERD.  Well controlled.  Minimal improvement with nexium and prilosec.   Cannot recall if dexilant " helped.  -Cont pantoprazole 40 mg BID  - Has not tied prevacid or aciphex    Eosinophilic esopahgitis. Diagnosed 10 years ago. Has taken swallowed fluticasone several years ago. Recent EGD with up to 160 eos per HPF. Now on pantoprazole BID     History of dysphagia. None for several months. On pantoprazole 40 mg BID.    Nausea and early satiety daily. Vomiting few times per month. Improved. Symptoms appeared to have been cyclical at symptom onset, but have been more constant since. Was told he has gastroparesis in 6/2018. Recent gastric emptying study normal. EGD with chronic inactive gastritis.  No  improvement with zofran 4 mg q 4-6 hrs   Some improvement with phenergan 25 mg suppositories PRN, but causes sedation  -Continue compazine 10 mg Q8 hrs PRN  -Continue gastroparesis diet. Has seen GI dietitian.   -Has not tried reglan, domperidone, scopolamine patch      DM 2. X couple of years. HbA1c 6.5 few months ago. Has taken metformin and injections in the past.  Taking glyxambi 25-5 mg daily x 1 year. Per pcp.     Abdominal pain. Improving. Nocturnal pain. EGD with inactive chronic gastritis. Gastric emptying study normal.   On Ibuprofen couple days weekly   -Continue IBgard  -Has not tried FDgard, Bentyl, Levsin, Levbid.    Gas and bloating. Better.   Not much difference off artificial sugars and lactose  -Continue IBgard    Black stools. Resolved. EGD/colon unrevealing. No anemia.    Weight loss. Lost 30 lbs since 7/2018. Weight stable. EGD/colon unreveaing.    Fatty liver. Hepatomegaly on abdominal us. Elevated bilirubin. MRI elastography with 25% steatosis (mod-severe) and normal to no fibrosis.   -Followed by hepatology      Follow-up in about 3 months (around 3/17/2019) for Motility w/ Dr. Posada.    1. Gastroesophageal reflux disease without esophagitis    2. Eosinophilic esophagitis    3. Nausea and vomiting, intractability of vomiting not specified, unspecified vomiting type    4. Early satiety    5.  Abdominal pain, unspecified abdominal location    6. Bloating          Order summary:         Thank you so much for allowing me to participate in the care of Isaak Webb          ALIS Staley, FNP-C             PHYSICAL EXAM:  T(C): 36.7 (09-07-19 @ 02:36), Max: 36.7 (09-07-19 @ 02:36)  HR: 95 (09-07-19 @ 02:36) (95 - 95)  BP: 171/130 (09-07-19 @ 02:36) (171/130 - 171/130)  RR: 18 (09-07-19 @ 02:36) (18 - 18)  SpO2: 99% (09-07-19 @ 02:36) (99% - 99%)    GENERAL:   HEAD:  Atraumatic, Normocephalic  EYES: EOMI, PERRLA, conjunctiva and sclera clear  NECK: Supple, No JVD  CHEST/LUNG: Clear to auscultation bilaterally; No wheeze; No crackles; No accessory muscles used  HEART: Regular rate and rhythm; No murmurs;   ABDOMEN: Soft, Nontender, Nondistended; Bowel sounds present; No guarding  EXTREMITIES:  2+ Peripheral Pulses, No cyanosis or edema  PSYCH: AAOx3  NEUROLOGY: non-focal  SKIN: No rashes or lesions

## 2019-10-25 ENCOUNTER — TELEPHONE (OUTPATIENT)
Dept: HEPATOLOGY | Facility: CLINIC | Age: 49
End: 2019-10-25

## 2019-10-25 NOTE — TELEPHONE ENCOUNTER
Called patient to schedule his 1 year follow up and patient stated he will call us when he is ready to schedule.

## 2022-05-27 ENCOUNTER — TELEPHONE (OUTPATIENT)
Dept: GASTROENTEROLOGY | Facility: CLINIC | Age: 52
End: 2022-05-27
Payer: COMMERCIAL

## 2022-05-27 NOTE — TELEPHONE ENCOUNTER
----- Message from Honey Montes De Oca sent at 5/27/2022  8:51 AM CDT -----  Contact: Patient  Patient calling in regards to receiving medical notes from previous colonoscopy.Patient stated that he need them sent to: Dr. Hector Porter  Requesting call back      Dr. Porter telephone number@[phone]           Patient@[phone]